# Patient Record
Sex: MALE | Race: WHITE
[De-identification: names, ages, dates, MRNs, and addresses within clinical notes are randomized per-mention and may not be internally consistent; named-entity substitution may affect disease eponyms.]

---

## 2017-05-02 ENCOUNTER — HOSPITAL ENCOUNTER (EMERGENCY)
Dept: HOSPITAL 74 - JERFT | Age: 32
Discharge: HOME | End: 2017-05-02
Payer: COMMERCIAL

## 2017-05-02 VITALS — DIASTOLIC BLOOD PRESSURE: 69 MMHG | HEART RATE: 91 BPM | SYSTOLIC BLOOD PRESSURE: 132 MMHG | TEMPERATURE: 98 F

## 2017-05-02 VITALS — BODY MASS INDEX: 22.8 KG/M2

## 2017-05-02 DIAGNOSIS — J30.2: Primary | ICD-10-CM

## 2017-05-02 NOTE — PDOC
History of Present Illness





- General


Chief Complaint: Respiratory


Stated Complaint: SHORTNESS OF BREATH


Time Seen by Provider: 05/02/17 15:57


History Source: Patient


Exam Limitations: No Limitations





- History of Present Illness


Initial Comments: 


CHIEF COMPLAINT:  30 y/o afebrile male with PMH asthma c/o runny nose, dry cough

, nasal congestion and SOB for the past 3 days. 





HISTORY OF PRESENT ILLNESS: He states he is very tired the past 3 days and 

simple tasks seem to fatigue him. The patient states his albuterol doesn't seem 

to be working for his current SOB symptoms.  He denies fever, chills, n/v/d, 

earache, sore throat, CP, palpitations, abd pain, back pain, dizziness, LOC. 





Vital signs on arrival are within normal limits. 





REVIEW OF SYSTEMS:


GENERAL/CONSTITUTIONAL: No fever/chills. No weakness. No weight change.


HEAD, EYES, EARS, NOSE AND THROAT: No change in vision. No ear pain or 

discharge. No sore throat.  +runny nose  +nasal congestion


CARDIOVASCULAR: No chest pain.  +SOB


RESPIRATORY: +dry cough.  No wheezing or hemoptysis.


GASTROINTESTINAL: No abd pain, nausea, vomiting, diarrhea. 


GENITOURINARY: No dysuria, frequency, or change in urination.


MUSCULOSKELETAL: No joint or muscle swelling or pain. No neck or back pain.


SKIN: No rash or easy bruising.


NEUROLOGIC: No headache, vertigo, loss of consciousness, or loss of sensation.








PHYSICAL EXAM:


GENERAL: The patient is awake, alert, and fully oriented, in no acute distress.

  He is well appearing, ambulatory, speaks in full sentences without difficulty.


HEAD: Normal with no signs of trauma.


ENT: Pupils equal, round and reactive to light, extraocular movements intact, 

sclera anicteric, conjunctiva clear. Nasal congestion.  No runny nose. 


LUNGS: Clear to auscultation bilaterally. Normal excursion. No respiratory 

distress or use of accessory muscles.


CV: RRR, S1/S2, no MRG. Cap refill < 2 sec.


ABDOMEN: Soft, non-distended, non-tender even to deep palpation, no 

hepatomegaly or splenomegaly, no masses.


EXTREMITIES: Normal range of motion, no edema.


NEUROLOGICAL: Normal speech, normal gait. CN II-XII grossly intact.


PSYCH: Normal mood, normal affect.


SKIN: Warm, dry, normal turgor, no rashes or lesions noted.











Past History





- Past Medical History


Allergies/Adverse Reactions: 


 Allergies











Allergy/AdvReac Type Severity Reaction Status Date / Time


 


shellfish derived Allergy   Verified 05/02/17 15:51











Home Medications: 


Ambulatory Orders





Budesonide/Formeterol Fumarate [SYMBICORT 80/4.5mcg -] 1 inh PO DAILY 06/04/16 


Prednisone [Deltasone -] 40 mg PO DAILY #8 tablet 05/02/17 








Asthma: Yes


Other medical history: scoliosis





- Psycho/Social/Smoking Cessation Hx


Anxiety: No


Suicidal Ideation: No


Smoking History: Never smoked


Have you smoked in the past 12 months: No


Information on smoking cessation initiated: No


Hx Alcohol Use: No


Drug/Substance Use Hx: No


Substance Use Type: None





*Physical Exam





- Vital Signs


 Last Vital Signs











Temp Pulse Resp BP Pulse Ox


 


 98 F   91 H  18   132/69   97 


 


 05/02/17 15:51  05/02/17 15:51  05/02/17 15:51  05/02/17 15:51  05/02/17 15:51














Medical Decision Making





- Medical Decision Making


A/P:  30 y/o male with seasonal allergy symptoms.  He states he didn't want to 

come here but his doctor suggested a chest xray.  I informed him I was going to 

send him for a chest xray and he refused it. 





Suggested he take Claritin D daily for the next 7 days. (he states he took it 

once yesterday and felt worse today).  I informed him of the rebound effect and 

suggested he take it every morning for 1 week.  will send an rx for prednisone 

and suggested he also continue using his albuterol inhaler if needed.  Pt 

instructed to return to the ER with any worsening or concerning symptoms. 





The patient verbalizes understanding of all instructions, has no further 

questions and is awaiting discharge.











*DC/Admit/Observation/Transfer


Diagnosis at time of Disposition: 


 Nasal congestion, Shortness of breath





Seasonal allergies


Qualifiers:


 Allergic rhinitis trigger: unspecified Qualified Code(s): J30.2 - Other 

seasonal allergic rhinitis





- Discharge Dispostion


Disposition: HOME


Condition at time of disposition: Good





- Prescriptions


Prescriptions: 


Prednisone [Deltasone -] 40 mg PO DAILY #8 tablet





- Referrals


Referrals: 


Raymond Claudio MD [Primary Care Provider] - Call tomorrow





- Patient Instructions


Printed Discharge Instructions:  DI for Shortness of Breath, Allergies (

Alternative Therapy), Allergies, Respiratory (Alternative Therapy), DI for 

Nasal Congestion


Additional Instructions: 


Discharge Instructions:


-Continue to use your albuterol inhaler for shortness of breath


-Continue taking over the counter 24 hour Claritin D every morning for the next 

7 days


-Take Prednisone as prescribed 


-Return to the ER immediately with any worsening or concerning symptoms

## 2019-03-26 ENCOUNTER — HOSPITAL ENCOUNTER (EMERGENCY)
Dept: HOSPITAL 74 - JER | Age: 34
Discharge: LEFT BEFORE DISCHARGE | End: 2019-03-26
Payer: COMMERCIAL

## 2019-03-26 ENCOUNTER — HOSPITAL ENCOUNTER (EMERGENCY)
Dept: HOSPITAL 74 - FER | Age: 34
Discharge: HOME | End: 2019-03-26
Payer: COMMERCIAL

## 2019-03-26 VITALS — DIASTOLIC BLOOD PRESSURE: 99 MMHG | HEART RATE: 108 BPM | SYSTOLIC BLOOD PRESSURE: 156 MMHG | TEMPERATURE: 97.7 F

## 2019-03-26 VITALS — DIASTOLIC BLOOD PRESSURE: 89 MMHG | SYSTOLIC BLOOD PRESSURE: 124 MMHG | TEMPERATURE: 97.5 F | HEART RATE: 87 BPM

## 2019-03-26 VITALS — BODY MASS INDEX: 22.8 KG/M2

## 2019-03-26 DIAGNOSIS — N35.919: ICD-10-CM

## 2019-03-26 DIAGNOSIS — Z53.21: Primary | ICD-10-CM

## 2019-03-26 DIAGNOSIS — J45.909: ICD-10-CM

## 2019-03-26 DIAGNOSIS — R39.198: Primary | ICD-10-CM

## 2019-03-26 PROCEDURE — 0T9B70Z DRAINAGE OF BLADDER WITH DRAINAGE DEVICE, VIA NATURAL OR ARTIFICIAL OPENING: ICD-10-PCS

## 2019-03-26 NOTE — PDOC
History of Present Illness





- General


History Source: Patient


Exam Limitations: No Limitations





- History of Present Illness


Initial Comments: 





03/26/19 21:44


The patient is a 33-year-old male, with a past medical history of asthma, 

urethral stricture (pt has been dilated 3x in the past), who presents to the ED 

with difficulty urinating since this afternoon a/w suprapubic discomfort. The 

patient states that he feels the urgency to use the bathroom, but when he 

attempts to go only a few drops of urine comes out. He is experiencing pressure 

to  the suprapubic area that is worsened when he moves. He was previously at 

[facility], but decided to come to [place] [facility] because he 

was waiting for too long. He denies having any testicular or scrotal pain. He 

denies any dysuria, hematuria or penile discharge. The patient has not followed 

up with his Urologist (Dr. Mc) for over a year. He denies trying 

anything for pain.





The patient denies any fevers, chills, nausea, vomiting, diarrhea, or 

constipation. Denies any chest pain or shortness of breath. 





Allergies: Shellfish derived.


Social History: None reported. 


Surgical History: Colorectal surgery for rectal prolapse. 








<Maxine Holt - Last Filed: 03/26/19 21:44>





- General


History Source: Patient


Exam Limitations: No Limitations





<Evie Sagastume - Last Filed: 03/26/19 23:15>





- General


Chief Complaint: Urinary Problem


Stated Complaint: CAN NOT URINATE


Time Seen by Provider: 03/26/19 20:27





Past History





<Maxine Holt - Last Filed: 03/26/19 21:44>





- Past Medical History


Asthma: Yes


COPD: No





- Suicide/Smoking/Psychosocial Hx


Smoking History: Never smoked


Have you smoked in the past 12 months: No


Hx Alcohol Use: No


Drug/Substance Use Hx: No


Substance Use Type: None





<Evie Sagastume - Last Filed: 03/26/19 23:15>





- Past Medical History


Allergies/Adverse Reactions: 


 Allergies











Allergy/AdvReac Type Severity Reaction Status Date / Time


 


shellfish derived Allergy   Verified 03/26/19 23:05











Home Medications: 


Ambulatory Orders





Budesonide/Formeterol Fumarate [SYMBICORT 80/4.5mcg -] 1 inh PO DAILY 06/04/16 


Albuterol Sulfate Inhaler - [Ventolin Hfa Inhaler -] 1 - 2 inh PO QID PRN 03/26/ 19 











**Review of Systems





- Review of Systems


Able to Perform ROS?: Yes


Comments:: 


03/26/19 21:44


General/Constitutional: no fevers or chills. No weakness. No sweats.


HEENT: no headache or dizziness. 


CVS:  no chest pain, palpitations or syncope.


Resp: no SOB, No cough. 


Gastrointestinal: (+)Suprapubic pain.  no nausea or vomiting or diarrhea. 


Genitourinary: (+)Urgency. +urinary retention. no hematuria, frequency.


MUSCULOSKELETAL: No joint pain and swelling. No neck or back pain.


SKIN: no redness or skin changes, no discharge, no rash. No wounds.


NEUROLOGIC: No headache, dizziness, No weakness, numbness or tingling. 


Allergic/Immunologic: no allergies


All other systems reviewed and negative, or as documented in HPI. 








<Maxine Holt - Last Filed: 03/26/19 21:44>





*Physical Exam





- Physical Exam


Comments: 





03/26/19 21:44


General:   Well appearing, awake and alert, NAD. 


HEENT:  NCAT, PERRL, EOMI, clear conjunctiva, anicteric, moist mucus membranes, 

clear oropharynx, no oral lesions.. 


Neck:  neck supple, FROM


Resp:  CTAB, normal and even respirations, no respiratory distress


CVS: RRR, no murmurs, 2+ peripheral pulses throughout, no peripheral edema


Abdomen: soft, (+)Suprapubic tenderness to palpation. Nondistended, no rebound 

or guarding. No CVAT.


:  Normal external genitalia, no discharge. Normal testicular lie, no 

erythema or tenderness.


Back: nontender, normal inspection and ROM


MSK:  no edema, RAYA x4, ROM intact. 


Neuro: alert


Skin: warm and well perfused, cap refill <2 sec, normal color











<Maxine Holt - Last Filed: 03/26/19 21:44>





ED Treatment Course





- ADDITIONAL ORDERS


Additional order review: 


 Laboratory  Results











  03/26/19





  20:41


 


Urine Color  Yellow


 


Urine Appearance  Clear


 


Urine pH  7.0


 


Urine Protein  Negative


 


Urine Glucose (UA)  Negative


 


Urine Ketones  Negative


 


Urine Blood  Trace-lysed


 


Urine Nitrite  Negative


 


Urine Bilirubin  Negative


 


Urine Urobilinogen  0.2


 


Ur Leukocyte Esterase  Trace














<Maxine Holt - Last Filed: 03/26/19 21:44>





- ADDITIONAL ORDERS


Additional order review: 


 Laboratory  Results











  03/26/19





  20:41


 


Urine Color  Yellow


 


Urine Appearance  Clear


 


Urine pH  7.0


 


Urine Protein  Negative


 


Urine Glucose (UA)  Negative


 


Urine Ketones  Negative


 


Urine Blood  Trace-lysed


 


Urine Nitrite  Negative


 


Urine Bilirubin  Negative


 


Urine Urobilinogen  0.2


 


Ur Leukocyte Esterase  Trace














<SagastumeEvie Tubbs - Last Filed: 03/26/19 23:15>





Medical Decision Making





- Medical Decision Making





03/26/19 23:06





hpi as documented


VS wnl,





ddx urinary retention, infection; doubt renal pathology or abdominal pathology, 

as abdomen exam otherwise benign, and mostly isolated to the suprapubic region 

from his mild retention.


pt with h/o urethral stricture s/p multiple dilations with urology


pt dribbles some urine. no functional US or bladder scan here to assess PVR.


suprapubic tenderness noted


attempts to make case passage x 3, with 16 Fr coude, 20Fr coude - under 

sterile conditions. unsuccessful, with inability to pass through point of 

stricture and traumatic with blood.


aborted procedure due to difficulty, and pt wished to attempt urination and 

already feeling better during ED stay. 


pt made attempts to go urinate, with increase passage of urine and feels less 

pressure and abdominal discomfort.


pt amenable to urology followup tomorrow with Dr Dr. Mc


additional urologists given for referrals.





Pt to be discharged in stable condition. Patient and family made aware of 

impression and plan, return precautions discussed (including but not limited to 

worsening pain or symptoms), fevers, or signs of infection, chest pain, 

respiratory distress, inability to tolerate oral intake, dehydration, syncope, 

or neurologic changes). Follow up with PMD and/or specialist as recommended, 

follow up information provided, take medications as instructed for duration of 

time. continue with supportive care, avoid triggers and precipitants. All 

questions answered to patient's satisfaction and expressed understanding and 

comfort with this. Patient does not suffer from an acute life-threatening 

medical condition at this time he is safe for outpatient follow-up. 





03/26/19 23:10





03/26/19 23:14








<Evie Sagastume - Last Filed: 03/26/19 23:15>





*DC/Admit/Observation/Transfer





- Attestations


Scribe Attestion: 





03/26/19 21:45





Documentation prepared by Maxine Holt, acting as medical scribe for Evie Sagastume MD.





<Maxine Holt - Last Filed: 03/26/19 21:44>





- Discharge Dispostion


Decision to Admit order: No





<Evie Sagastume - Last Filed: 03/26/19 23:15>


Diagnosis at time of Disposition: 


 Difficulty urinating








- Discharge Dispostion


Disposition: HOME


Condition at time of disposition: Good





- Referrals


Referrals: 


Raymond Mc MD [Staff Physician] - 


Ricardo Thomas MD [Staff Physician] - 


Cosmo Champion MD., MD [Staff Physician] - 


Nacho Urena MD [Staff Physician] - 


Carlene Garces MD [Staff Physician] - 


Kasi Garces MD [Non Staff, Medical] - 





- Patient Instructions


Printed Discharge Instructions:  Blood in Urine, DI for Urinary Retention in Men


Additional Instructions: 





you had difficulty urinating


Passage of Case was attempted multiple times which was unsuccessful


since you are able to urinate that is reassuring


if worsening urinary retention, inability to urinate, abdominal pain, infection 

or fever, return sooner for evaluation


otherwise see your urologist and call for appointment tomorrow for follow up of 

your urethral stricturing perhaps needing additional dilation


stay well hydrated


you may continue to urinate blood after today's case attempts.

## 2019-03-27 LAB
BACTERIA #/AREA URNS HPF: (no result) /HPF
RBC # BLD AUTO: (no result) /HPF (ref 0–4)
SPERM # UR AUTO: (no result) /UL
WBC # UR AUTO: (no result) /UL

## 2022-09-09 ENCOUNTER — HOSPITAL ENCOUNTER (EMERGENCY)
Dept: HOSPITAL 74 - JERFT | Age: 37
Discharge: HOME | End: 2022-09-09
Payer: COMMERCIAL

## 2022-09-09 VITALS
HEART RATE: 68 BPM | SYSTOLIC BLOOD PRESSURE: 122 MMHG | DIASTOLIC BLOOD PRESSURE: 69 MMHG | RESPIRATION RATE: 17 BRPM | TEMPERATURE: 98.1 F

## 2022-09-09 VITALS — BODY MASS INDEX: 23.4 KG/M2
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
## 2022-09-09 DIAGNOSIS — S22.32XA: ICD-10-CM

## 2022-09-09 DIAGNOSIS — S39.011A: Primary | ICD-10-CM
